# Patient Record
Sex: MALE | Race: BLACK OR AFRICAN AMERICAN | NOT HISPANIC OR LATINO | Employment: OTHER | URBAN - NONMETROPOLITAN AREA
[De-identification: names, ages, dates, MRNs, and addresses within clinical notes are randomized per-mention and may not be internally consistent; named-entity substitution may affect disease eponyms.]

---

## 2017-01-01 ENCOUNTER — TELEPHONE (OUTPATIENT)
Dept: CARDIOLOGY | Facility: CLINIC | Age: 77
End: 2017-01-01

## 2017-01-01 ENCOUNTER — OFFICE VISIT (OUTPATIENT)
Dept: CARDIOLOGY | Facility: CLINIC | Age: 77
End: 2017-01-01

## 2017-01-01 VITALS
HEIGHT: 66 IN | WEIGHT: 217 LBS | DIASTOLIC BLOOD PRESSURE: 80 MMHG | HEART RATE: 68 BPM | SYSTOLIC BLOOD PRESSURE: 124 MMHG | BODY MASS INDEX: 34.87 KG/M2

## 2017-01-01 DIAGNOSIS — R94.31 LEFT AXIS DEVIATION: ICD-10-CM

## 2017-01-01 DIAGNOSIS — I27.20 PULMONARY HYPERTENSION (HCC): ICD-10-CM

## 2017-01-01 DIAGNOSIS — I10 ESSENTIAL HYPERTENSION: ICD-10-CM

## 2017-01-01 DIAGNOSIS — I48.0 PAROXYSMAL ATRIAL FIBRILLATION (HCC): Primary | ICD-10-CM

## 2017-01-01 DIAGNOSIS — Z79.01 CURRENT USE OF LONG TERM ANTICOAGULATION: ICD-10-CM

## 2017-01-01 PROCEDURE — 99213 OFFICE O/P EST LOW 20 MIN: CPT | Performed by: NURSE PRACTITIONER

## 2017-01-01 PROCEDURE — 93000 ELECTROCARDIOGRAM COMPLETE: CPT | Performed by: NURSE PRACTITIONER

## 2017-01-06 ENCOUNTER — TELEPHONE (OUTPATIENT)
Dept: CARDIOLOGY | Facility: CLINIC | Age: 77
End: 2017-01-06

## 2017-01-18 ENCOUNTER — TELEPHONE (OUTPATIENT)
Dept: CARDIOLOGY | Facility: CLINIC | Age: 77
End: 2017-01-18

## 2017-02-08 ENCOUNTER — TELEPHONE (OUTPATIENT)
Dept: CARDIOLOGY | Facility: CLINIC | Age: 77
End: 2017-02-08

## 2017-02-08 NOTE — TELEPHONE ENCOUNTER
Patient came in to office requesting samples of eliquis 5mg bid, eliquis 5mg samples # 28 tablets given to patient.

## 2017-02-22 ENCOUNTER — TELEPHONE (OUTPATIENT)
Dept: CARDIOLOGY | Facility: CLINIC | Age: 77
End: 2017-02-22

## 2017-03-24 ENCOUNTER — TELEPHONE (OUTPATIENT)
Dept: CARDIOLOGY | Facility: CLINIC | Age: 77
End: 2017-03-24

## 2017-04-21 ENCOUNTER — TELEPHONE (OUTPATIENT)
Dept: CARDIOLOGY | Facility: CLINIC | Age: 77
End: 2017-04-21

## 2017-04-21 NOTE — TELEPHONE ENCOUNTER
Zuleima called requesting samples of Eliquis 5mg bid. Samples of Eliquis 5mg ready for -42 tablets.

## 2017-05-12 ENCOUNTER — TELEPHONE (OUTPATIENT)
Dept: CARDIOLOGY | Facility: CLINIC | Age: 77
End: 2017-05-12

## 2017-06-20 ENCOUNTER — OFFICE VISIT (OUTPATIENT)
Dept: CARDIOLOGY | Facility: CLINIC | Age: 77
End: 2017-06-20

## 2017-06-20 VITALS
BODY MASS INDEX: 34.72 KG/M2 | WEIGHT: 216 LBS | SYSTOLIC BLOOD PRESSURE: 120 MMHG | HEART RATE: 68 BPM | DIASTOLIC BLOOD PRESSURE: 90 MMHG | HEIGHT: 66 IN

## 2017-06-20 DIAGNOSIS — R07.89 OTHER CHEST PAIN: ICD-10-CM

## 2017-06-20 DIAGNOSIS — I10 ESSENTIAL HYPERTENSION: ICD-10-CM

## 2017-06-20 DIAGNOSIS — G47.34 NOCTURNAL OXYGEN DESATURATION: ICD-10-CM

## 2017-06-20 DIAGNOSIS — I27.20 PULMONARY HYPERTENSION (HCC): ICD-10-CM

## 2017-06-20 DIAGNOSIS — I48.0 PAROXYSMAL ATRIAL FIBRILLATION (HCC): Primary | ICD-10-CM

## 2017-06-20 DIAGNOSIS — Z79.899 LONG TERM CURRENT USE OF ANTIARRHYTHMIC DRUG: ICD-10-CM

## 2017-06-20 DIAGNOSIS — R06.02 SHORTNESS OF BREATH: ICD-10-CM

## 2017-06-20 DIAGNOSIS — Z79.01 CURRENT USE OF LONG TERM ANTICOAGULATION: ICD-10-CM

## 2017-06-20 PROCEDURE — 93000 ELECTROCARDIOGRAM COMPLETE: CPT | Performed by: NURSE PRACTITIONER

## 2017-06-20 PROCEDURE — 99214 OFFICE O/P EST MOD 30 MIN: CPT | Performed by: NURSE PRACTITIONER

## 2017-06-20 RX ORDER — ALBUTEROL SULFATE 90 UG/1
2 AEROSOL, METERED RESPIRATORY (INHALATION) EVERY 4 HOURS PRN
COMMUNITY

## 2017-06-20 RX ORDER — HYDRALAZINE HYDROCHLORIDE 10 MG/1
10 TABLET, FILM COATED ORAL DAILY PRN
Qty: 90 TABLET | Refills: 2 | Status: SHIPPED | OUTPATIENT
Start: 2017-06-20 | End: 2017-01-01 | Stop reason: ALTCHOICE

## 2017-06-20 NOTE — PROGRESS NOTES
Chief Complaint   Patient presents with   • Follow-up     6 month follow-up, reports has been having issues with HTN, reports feeling tired all the time and falling asleep during the day, does not wear oxygen at night,    • Shortness of Breath     at times, reports has been using Pro air inhaler and has been helpful.       Cardiac Complaints  chest pressure/discomfort and dyspnea      Subjective   Fadi Teran is a 76 y.o. male with a history of nonobstructive disease diagnosed in 1999 being managed medically. His cardiac workup in 2013 showed LV function was slightly low with some reverse redistribution in the anterior wall. Later Lopressor was added for better blood pressure control. In 2015, he underwent a cardiac catheterization that showed no significant disease. He also had a carotid ultrasound that did not show significant stenosis. Later an event monitor was ordered that showed a baseline sinus rhythm. In March 2016 he was hospitalized for syncope. Echocardiogram showed a normal ejection fraction but his rhythm was in atrial fibrillation. He is being managed medically with amiodarone and Xarelto.  He returns today for follow up and states recent ER visit for hypertension.  He reports he has been having very labile blood pressures which go from very low to very high.  He does have medication changes with amiodarone being off his list as well as norvasc.  He states these changes were with PCP.  He has complaints today of chest pressure which seems to be a tightness in the left chest, sometimes comes and goes and sometimes stays all day.  Not related to exertion and he denies correlation with food.  He also has been falling asleep a lot during the day and seems to be more short of breath both with and without exertion. He did have a positive overnight in December but has not been wearing oxygen.  Labs he reports with PCP as well as most refills.          Cardiac History  Past Surgical History:   Procedure  Laterality Date   • CARDIAC CATHETERIZATION  03/22/1999    < 40% LCX. EF 45-50%.     • CARDIAC CATHETERIZATION  07/20/2015    No significant disease     • CARDIOVASCULAR STRESS TEST  09/09/2010     (Mod) 3 min, 80% THR. BP- 156/60.     • CARDIOVASCULAR STRESS TEST  04/23/2013     L. Myoview- EF 45%. Reverse Redistribution in Anterior     • CARDIOVASCULAR STRESS TEST  07/19/2015    Lexiscan (LCR)- EF 48%, moderate inferior ischemia     • CONVERTED (HISTORICAL) DUPLEX SCANS  12/07/2013    Carotid u/s- no evidence of hemodynamically significant stenosis     • CONVERTED (HISTORICAL) DUPLEX SCANS  07/21/2015    Carotid US- negative     • ECHO - CONVERTED  12/05/2007    EF 50-55%, RVSP- 57 mmHg.     • ECHO - CONVERTED  09/09/2010     EF 40-45%, RVSP 48 mmHg     • ECHO - CONVERTED  04/23/2013    EF 45-50%. Anterior WMA. RVSP- 46 mmHg.     • ECHO - CONVERTED  07/19/2015    (LCR)- EF 40-45%, RSVP 37, no effusion     • ECHO - CONVERTED  03/01/2016    EF 60%, atrial fibrillation, mild MR, mild to mod AI, RVSP 50 mmHg     • OTHER SURGICAL HISTORY  2015    eCardio- sinus, no medication changes made     • OTHER SURGICAL HISTORY  12/01/2016    Positive for nocturnal desaturation       Current Outpatient Prescriptions   Medication Sig Dispense Refill   • albuterol (PROVENTIL HFA;VENTOLIN HFA) 108 (90 BASE) MCG/ACT inhaler Inhale 2 puffs Every 4 (Four) Hours As Needed for Wheezing.     • apixaban (ELIQUIS) 5 MG tablet tablet Take 1 tablet by mouth 2 (Two) Times a Day. 28 tablet 0   • glimepiride (AMARYL) 2 MG tablet Take 2 mg by mouth Every Morning Before Breakfast.     • lisinopril-hydrochlorothiazide (PRINZIDE,ZESTORETIC) 20-25 MG per tablet Take 1 tablet by mouth daily.     • metoprolol tartrate (LOPRESSOR) 25 MG tablet Take 25 mg by mouth 2 (Two) Times a Day.     • potassium chloride (K-DUR,KLOR-CON) 10 MEQ CR tablet Take 10 mEq by mouth daily.     • hydrALAZINE (APRESOLINE) 10 MG tablet Take 1 tablet by mouth Daily As Needed  "(take one tablet if blood pressure is greater than 160/90). 90 tablet 2     No current facility-administered medications for this visit.        Contrast dye    Past Medical History:   Diagnosis Date   • CAD (coronary artery disease)     Non-obstructive   • CHF (congestive heart failure)    • Clotting disorder    • H. pylori infection    • History of CT scan of head 07/21/2015     No acute changes seen     • Hypercholesterolemia    • Hypertension    • Hypertensive heart disease        Social History     Social History   • Marital status:      Spouse name: N/A   • Number of children: N/A   • Years of education: N/A     Occupational History   • Not on file.     Social History Main Topics   • Smoking status: Never Smoker   • Smokeless tobacco: Never Used   • Alcohol use No   • Drug use: No   • Sexual activity: Not on file     Other Topics Concern   • Not on file     Social History Narrative       Family History   Problem Relation Age of Onset   • Heart disease Father        Review of Systems   Constitutional: Positive for fatigue.   HENT: Negative.    Eyes: Negative.    Respiratory: Positive for shortness of breath.    Cardiovascular: Positive for chest pain and palpitations.   Endocrine: Negative.    Skin: Negative.    Neurological: Negative.    Psychiatric/Behavioral: Negative.        DiabetesYes  Thyroidnormal    Objective     /90 (BP Location: Left arm)  Pulse 68  Ht 66\" (167.6 cm)  Wt 216 lb (98 kg)  BMI 34.86 kg/m2    Physical Exam   Constitutional: He is oriented to person, place, and time. He appears well-developed and well-nourished.   HENT:   Head: Normocephalic and atraumatic.   Eyes: EOM are normal. Pupils are equal, round, and reactive to light.   Neck: Normal range of motion. Neck supple.   Cardiovascular: Normal rate and regular rhythm.    Murmur heard.  Pulmonary/Chest: Effort normal and breath sounds normal.   Abdominal: Soft.   Neurological: He is alert and oriented to person, place, " and time.   Skin: Skin is warm and dry.   Psychiatric: He has a normal mood and affect.         ECG 12 Lead  Date/Time: 6/20/2017 12:50 PM  Performed by: PETTY BROWN  Authorized by: PETTY BROWN   Rhythm: sinus rhythm  BPM: 68  Conduction: 1st degree  Clinical impression: abnormal ECG  Comments: Normal QT            Assessment/Plan     HR is stable.  Diastolic blood pressure is slightly elevated but patient states at home his blood pressure is often 190/100.  We will add hydralazine 10mg po prn for patient to take daily as needed for SBP greater than 160 and DBP greater than 90.  EKG done today for PAF showed NSR with first degree block and normal QT.   We will advise on repeat overnight oximetry to look for any worsening nocturnal desaturation as he states he is not wearing oxygen at night and is always tired during the day and has a great deal of issues with labile blood pressures, which are often higher in the morning.  We will also advise on cardiac workup as he does report some chest pain at times which appears to be atypical, but patient has had a positive stress in the past.  We will also encourage on echo to look for any worsening pulmonary pressures as more shortness of breath reported. More recommendations to follow.  Good cardiac ADA diet and activity as tolerated advised.  Samples of eliquis provided.  6 month follow up will be advised or sooner if needed.      Problems Addressed this Visit        Cardiovascular and Mediastinum    Essential hypertension    Relevant Medications    hydrALAZINE (APRESOLINE) 10 MG tablet    Other Relevant Orders    Overnight Sleep Oximetry Study    Adult Transthoracic Echo Complete    Stress Test With Myocardial Perfusion One Day    Pulmonary hypertension    Relevant Orders    Overnight Sleep Oximetry Study    Paroxysmal atrial fibrillation - Primary    Relevant Orders    ECG 12 Lead    Adult Transthoracic Echo Complete    Stress Test With Myocardial Perfusion One  Day       Other    Current use of long term anticoagulation      Other Visit Diagnoses     Nocturnal oxygen desaturation        Relevant Orders    Overnight Sleep Oximetry Study    Long term current use of antiarrhythmic drug        Other chest pain        Relevant Orders    Adult Transthoracic Echo Complete    Stress Test With Myocardial Perfusion One Day    Shortness of breath        Relevant Orders    Adult Transthoracic Echo Complete    Stress Test With Myocardial Perfusion One Day                  Electronically signed by Kinjal Hdz, CARLY June 20, 2017 5:25 PM

## 2017-06-26 ENCOUNTER — HOSPITAL ENCOUNTER (OUTPATIENT)
Dept: CARDIOLOGY | Facility: HOSPITAL | Age: 77
Discharge: HOME | End: 2017-06-26

## 2017-06-26 ENCOUNTER — OUTSIDE FACILITY SERVICE (OUTPATIENT)
Dept: CARDIOLOGY | Facility: CLINIC | Age: 77
End: 2017-06-26

## 2017-06-26 DIAGNOSIS — R06.02 SHORTNESS OF BREATH: ICD-10-CM

## 2017-06-26 DIAGNOSIS — R07.89 OTHER CHEST PAIN: ICD-10-CM

## 2017-06-26 DIAGNOSIS — I10 ESSENTIAL HYPERTENSION: ICD-10-CM

## 2017-06-26 DIAGNOSIS — I48.0 PAROXYSMAL ATRIAL FIBRILLATION (HCC): ICD-10-CM

## 2017-06-26 LAB
MAXIMAL PREDICTED HEART RATE: 144 BPM
STRESS TARGET HR: 122 BPM

## 2017-06-26 PROCEDURE — 93017 CV STRESS TEST TRACING ONLY: CPT

## 2017-06-26 PROCEDURE — A9500 TC99M SESTAMIBI: HCPCS | Performed by: INTERNAL MEDICINE

## 2017-06-26 PROCEDURE — 0 TECHNETIUM SESTAMIBI: Performed by: INTERNAL MEDICINE

## 2017-06-26 PROCEDURE — 93306 TTE W/DOPPLER COMPLETE: CPT | Performed by: INTERNAL MEDICINE

## 2017-06-26 PROCEDURE — 93018 CV STRESS TEST I&R ONLY: CPT | Performed by: INTERNAL MEDICINE

## 2017-06-26 PROCEDURE — 78452 HT MUSCLE IMAGE SPECT MULT: CPT | Performed by: INTERNAL MEDICINE

## 2017-06-26 PROCEDURE — 78452 HT MUSCLE IMAGE SPECT MULT: CPT

## 2017-06-26 PROCEDURE — 93306 TTE W/DOPPLER COMPLETE: CPT

## 2017-06-26 PROCEDURE — 25010000002 REGADENOSON 0.4 MG/5ML SOLUTION: Performed by: INTERNAL MEDICINE

## 2017-06-26 RX ADMIN — REGADENOSON 0.4 MG: 0.08 INJECTION, SOLUTION INTRAVENOUS at 13:00

## 2017-06-26 RX ADMIN — Medication 1 DOSE: at 13:00

## 2017-06-28 ENCOUNTER — TELEPHONE (OUTPATIENT)
Dept: CARDIOLOGY | Facility: CLINIC | Age: 77
End: 2017-06-28

## 2017-07-28 ENCOUNTER — TELEPHONE (OUTPATIENT)
Dept: CARDIOLOGY | Facility: CLINIC | Age: 77
End: 2017-07-28

## 2017-07-28 NOTE — TELEPHONE ENCOUNTER
Patient called requesting samples of Eliquis 5mg bid. Samples of Eliquis 5mg ready for -28 tablets.

## 2017-08-11 ENCOUNTER — TELEPHONE (OUTPATIENT)
Dept: CARDIOLOGY | Facility: CLINIC | Age: 77
End: 2017-08-11

## 2017-08-11 NOTE — TELEPHONE ENCOUNTER
Patients wife called requesting samples of Eliquis 5mg bid for patient, samples of Eliquis 5mg ready for -28 tablets.

## 2017-09-01 ENCOUNTER — TELEPHONE (OUTPATIENT)
Dept: CARDIOLOGY | Facility: CLINIC | Age: 77
End: 2017-09-01

## 2017-09-22 ENCOUNTER — TELEPHONE (OUTPATIENT)
Dept: CARDIOLOGY | Facility: CLINIC | Age: 77
End: 2017-09-22

## 2017-09-22 NOTE — TELEPHONE ENCOUNTER
Patients wife called requesting samples of Eliquis 5mg bid. Samples of Eliquis 5mg ready for -14 tablets.

## 2017-10-13 ENCOUNTER — TELEPHONE (OUTPATIENT)
Dept: CARDIOLOGY | Facility: CLINIC | Age: 77
End: 2017-10-13

## 2017-10-13 NOTE — TELEPHONE ENCOUNTER
Zuleima called requesting samples of Eliquis 5mg per patients request. Samples of Eliquis 5mg ready for -28 tablets.

## 2017-10-27 ENCOUNTER — TELEPHONE (OUTPATIENT)
Dept: CARDIOLOGY | Facility: CLINIC | Age: 77
End: 2017-10-27

## 2017-10-27 NOTE — TELEPHONE ENCOUNTER
Patient's wife Zuleima called requesting samples of Eliquis 5 mg BID. Zuleima aware that samples are ready to be picked up.

## 2017-11-17 NOTE — TELEPHONE ENCOUNTER
Zuleima called requesting samples of Eliquis 5mg bid, samples of Eliquis 5mg ready for -28 tablets.

## 2017-12-01 NOTE — TELEPHONE ENCOUNTER
Received request for samples of Eliquis 5 mg bid, samples of Eliquis 5 mg # 42 tablet's prepared for patient to .

## 2017-12-18 NOTE — PROGRESS NOTES
Chief Complaint   Patient presents with   • Follow-up     For cardiac management. Last labs 2 months ago per PCP. PCP refills medications. He states having problems when he eats swallowing his food.   • Shortness of Breath     He states having more shortness of breath, is now using inhaler.   • Dizziness     Only if he turns his head to fast, has occasional balance problem.   • Med Refill     Needs samples of Eliquis.       Subjective       Fadi Teran is a 77 y.o. male with a history of nonobstructive disease diagnosed in 1999 being managed medically. His cardiac workup in 2013 showed LV function was slightly low with some reverse redistribution in the anterior wall. Later Lopressor was added for better blood pressure control. In 2015, he underwent a cardiac catheterization that showed no significant disease. He also had a carotid ultrasound that did not show significant stenosis. Later an event monitor was ordered that showed a baseline sinus rhythm. In March 2016 he was hospitalized for syncope. Echocardiogram showed a normal ejection fraction but his rhythm was in atrial fibrillation. He is being managed medically with rate control and Xarelto.   At his last office visit an overnight oxygen study, stress and echo ordered. Overnight oxygen study was abnormal, but he is not using oxygen or CPAP. Stress test showed some reperfusion issue with inferior wall being managed medically. Echo showed LV ejection fraction 50-55% with no significant valvular issues. PA pressure was increased.   Today he comes to the office for a follow up visit and denies chest pain. He is now using an inhaler which has improved shortness of breath. He denies lightheadedness but admits to sometimes having mild dizziness when turns his head or quick position change.     HPI         Cardiac History:    Past Surgical History:   Procedure Laterality Date   • CARDIAC CATHETERIZATION  03/22/1999    < 40% LCX. EF 45-50%.     • CARDIAC  CATHETERIZATION  07/20/2015    No significant disease     • CARDIOVASCULAR STRESS TEST  09/09/2010     (Mod) 3 min, 80% THR. BP- 156/60.     • CARDIOVASCULAR STRESS TEST  04/23/2013     L. Myoview- EF 45%. Reverse Redistribution in Anterior     • CARDIOVASCULAR STRESS TEST  07/19/2015    Lexiscan (LCR)- EF 48%, moderate inferior ischemia     • CARDIOVASCULAR STRESS TEST  06/26/2017    Lexiscan- EF 4045%, few PVCs, reperfusion changes inferior wall, medication management   • CONVERTED (HISTORICAL) DUPLEX SCANS  12/07/2013    Carotid u/s- no evidence of hemodynamically significant stenosis     • CONVERTED (HISTORICAL) DUPLEX SCANS  07/21/2015    Carotid US- negative     • ECHO - CONVERTED  12/05/2007    EF 50-55%, RVSP- 57 mmHg.     • ECHO - CONVERTED  09/09/2010     EF 40-45%, RVSP 48 mmHg     • ECHO - CONVERTED  04/23/2013    EF 45-50%. Anterior WMA. RVSP- 46 mmHg.     • ECHO - CONVERTED  07/19/2015    (LCR)- EF 40-45%, RSVP 37, no effusion     • ECHO - CONVERTED  03/01/2016    EF 60%, atrial fibrillation, mild MR, mild to mod AI, RVSP 50 mmHg     • ECHO - CONVERTED  06/26/2017    EF 50-55%, no AS, mild MR, RVSP 48 mmHg   • OTHER SURGICAL HISTORY  2015    eCardio- sinus, no medication changes made     • OTHER SURGICAL HISTORY  12/01/2016    Positive for nocturnal desaturation       Current Outpatient Prescriptions   Medication Sig Dispense Refill   • albuterol (PROVENTIL HFA;VENTOLIN HFA) 108 (90 BASE) MCG/ACT inhaler Inhale 2 puffs Every 4 (Four) Hours As Needed for Wheezing.     • apixaban (ELIQUIS) 5 MG tablet tablet Take 1 tablet by mouth 2 (Two) Times a Day. 42 tablet 0   • glimepiride (AMARYL) 2 MG tablet Take 2 mg by mouth Every Morning Before Breakfast.     • lisinopril-hydrochlorothiazide (PRINZIDE,ZESTORETIC) 20-25 MG per tablet Take 1 tablet by mouth 2 (Two) Times a Day.     • metoprolol tartrate (LOPRESSOR) 25 MG tablet Take 25 mg by mouth 2 (Two) Times a Day.       No current facility-administered  medications for this visit.        Contrast dye    Past Medical History:   Diagnosis Date   • CAD (coronary artery disease)     Non-obstructive   • CHF (congestive heart failure)    • Clotting disorder    • H. pylori infection    • History of CT scan of head 07/21/2015     No acute changes seen     • Hypercholesterolemia    • Hypertension    • Hypertensive heart disease        Social History     Social History   • Marital status:      Spouse name: N/A   • Number of children: N/A   • Years of education: N/A     Occupational History   • Not on file.     Social History Main Topics   • Smoking status: Never Smoker   • Smokeless tobacco: Never Used   • Alcohol use No   • Drug use: No   • Sexual activity: Not on file     Other Topics Concern   • Not on file     Social History Narrative       Family History   Problem Relation Age of Onset   • Heart disease Father        Review of Systems   Constitution: Negative for decreased appetite, diaphoresis, fever and malaise/fatigue.   HENT: Negative for congestion, nosebleeds and sore throat.    Eyes: Negative for blurred vision.   Cardiovascular: Positive for irregular heartbeat. Negative for chest pain, near-syncope and paroxysmal nocturnal dyspnea.   Respiratory: Positive for shortness of breath. Negative for cough and sleep disturbances due to breathing.    Endocrine: Negative for cold intolerance and heat intolerance.   Hematologic/Lymphatic: Negative for adenopathy. Does not bruise/bleed easily.   Skin: Negative for itching and nail changes.   Musculoskeletal: Negative for arthritis and myalgias.   Gastrointestinal: Positive for dysphagia. Negative for abdominal pain, heartburn, melena, nausea and vomiting.   Genitourinary: Positive for nocturia. Negative for dysuria and hematuria.   Neurological: Positive for dizziness. Negative for light-headedness, seizures and vertigo.   Psychiatric/Behavioral: Negative for altered mental status. The patient does not have  "insomnia.    Allergic/Immunologic: Negative for environmental allergies and hives.    Diabetes- Yes  Thyroid-normal    Objective     /80 (BP Location: Left arm)  Pulse 68  Ht 167.6 cm (65.98\")  Wt 98.4 kg (217 lb)  BMI 35.04 kg/m2    Physical Exam   Constitutional: He is oriented to person, place, and time. He appears well-nourished.   HENT:   Head: Normocephalic.   Eyes: Conjunctivae are normal. Pupils are equal, round, and reactive to light.   Neck: Normal range of motion. Neck supple. No JVD present. Carotid bruit is not present.   Cardiovascular: Normal rate, regular rhythm, S1 normal and S2 normal.    No murmur heard.  Pulmonary/Chest: Breath sounds normal. He has no wheezes. He has no rales.   Abdominal: Soft. Bowel sounds are normal. He exhibits no distension. There is no tenderness.   Musculoskeletal: Normal range of motion. He exhibits no edema.   Neurological: He is alert and oriented to person, place, and time.   Skin: Skin is warm. No rash noted. No pallor.   Psychiatric: He has a normal mood and affect. His speech is normal and behavior is normal.   Vitals reviewed.       ECG 12 Lead  Date/Time: 12/19/2017 5:29 PM  Performed by: BONNIE KENDALL  Authorized by: BONNIE KENDALL   Comparison: compared with previous ECG from 6/20/2017  Comparison to previous ECG: Sinus first degree AV block  Rhythm: sinus rhythm  BPM: 68  QRS axis: left                  Assessment/Plan      Fadi was seen today for follow-up, shortness of breath, dizziness and med refill.    Diagnoses and all orders for this visit:    Paroxysmal atrial fibrillation    Essential hypertension    Pulmonary hypertension    Current use of long term anticoagulation    Left axis deviation    Other orders  -     ECG 12 Lead      Overnight oxygen study done last year showed mild nocturnal desaturation and echo showed increased PA pressure. I advised him to further discuss with you if recommend referral for sleep study. He reports " improvement of shortness of breath with use of inhaler. He also reports some issues with swallowing food. He may need EGD and advised to also discuss with you.   We also reviewed his most recent stress and echo reports. LV function low normal and reverse redistribution noted with recommendation for medical management. His blood pressure and heart rate are normal. For management of PAF an EKG done today that shows sinus rhythm. No medication changes made today. Samples of Eliquis given. Good diet and weight loss encouraged. He follows with you for management of labs.               Electronically signed by CARLY Mike,  December 19, 2017 5:29 PM

## 2018-01-01 ENCOUNTER — TELEPHONE (OUTPATIENT)
Dept: CARDIOLOGY | Facility: CLINIC | Age: 78
End: 2018-01-01

## 2018-01-01 ENCOUNTER — OFFICE VISIT (OUTPATIENT)
Dept: CARDIOLOGY | Facility: CLINIC | Age: 78
End: 2018-01-01

## 2018-01-01 VITALS
SYSTOLIC BLOOD PRESSURE: 134 MMHG | DIASTOLIC BLOOD PRESSURE: 92 MMHG | BODY MASS INDEX: 33.59 KG/M2 | WEIGHT: 209 LBS | HEART RATE: 86 BPM | HEIGHT: 66 IN

## 2018-01-01 DIAGNOSIS — Z79.01 CURRENT USE OF LONG TERM ANTICOAGULATION: ICD-10-CM

## 2018-01-01 DIAGNOSIS — I10 ESSENTIAL HYPERTENSION: ICD-10-CM

## 2018-01-01 DIAGNOSIS — I25.10 CORONARY ARTERY DISEASE INVOLVING NATIVE CORONARY ARTERY OF NATIVE HEART WITHOUT ANGINA PECTORIS: ICD-10-CM

## 2018-01-01 DIAGNOSIS — I51.9 LV DYSFUNCTION: ICD-10-CM

## 2018-01-01 DIAGNOSIS — I45.4 IVCD (INTRAVENTRICULAR CONDUCTION DEFECT): ICD-10-CM

## 2018-01-01 DIAGNOSIS — I48.0 PAROXYSMAL ATRIAL FIBRILLATION (HCC): Primary | ICD-10-CM

## 2018-01-01 DIAGNOSIS — I27.20 PULMONARY HYPERTENSION (HCC): ICD-10-CM

## 2018-01-01 DIAGNOSIS — I49.3 PVC (PREMATURE VENTRICULAR CONTRACTION): ICD-10-CM

## 2018-01-01 PROCEDURE — 99213 OFFICE O/P EST LOW 20 MIN: CPT | Performed by: NURSE PRACTITIONER

## 2018-01-01 PROCEDURE — 93000 ELECTROCARDIOGRAM COMPLETE: CPT | Performed by: NURSE PRACTITIONER

## 2018-01-01 RX ORDER — LISINOPRIL AND HYDROCHLOROTHIAZIDE 25; 20 MG/1; MG/1
1 TABLET ORAL DAILY
Qty: 90 TABLET | Refills: 3 | Status: SHIPPED | OUTPATIENT
Start: 2018-01-01

## 2018-06-18 NOTE — PROGRESS NOTES
Chief Complaint   Patient presents with   • Follow-up     For cardiac management. Needs refills on cardiac meds for 90 days to [facility] in [place]. Needs samples of Eliquis- will have ready. Labs per PCP about 2 months ago.    • Fatigue     Feels tired and weak- about the same as before.    • Numbness     Was working in the heat last week and started having numbness down left arm and in lips. States that he felt his left arm with his right hand and it felt very cold. Improved with rest but states that it took a while to resolve.        Subjective       Fadi Teran is a 77 y.o. male with a history of nonobstructive disease diagnosed in 1999 being managed medically. His cardiac workup in 2013 showed LV function was slightly low with some reverse redistribution in the anterior wall. Later Lopressor was added for better blood pressure control. In 2015, he underwent a cardiac catheterization that showed no significant disease. He also had a carotid ultrasound that did not show significant stenosis. Later an event monitor was ordered that showed a baseline sinus rhythm. In March 2016 he was hospitalized for syncope. Echocardiogram showed a normal ejection fraction but his rhythm was in atrial fibrillation. He is being managed medically with rate control and Xarelto. IN 2017, an overnight oxygen study, stress and echo ordered. Overnight oxygen study was abnormal, but he is not using oxygen or CPAP. Stress test showed some reperfusion issue with inferior wall being managed medically. Echo showed LV ejection fraction 50-55% with no significant valvular issues. PA pressure was increased.   Today he comes to the office for a follow up visit and denies new or worsening symptoms. He states he does have fatigue, but same as usual. He reports last week working on tractor in the sun racking hay, began to get to hot and developed numbness in left side of face and left arm. He went inside, rested and rehydrated and symptoms  resolved. He denies reoccurrence since that time. He admits to being easily choked when drinking liquids, which is not a new symptom. He reports recent labs were good.     HPI     Cardiac History:    Past Surgical History:   Procedure Laterality Date   • CARDIAC CATHETERIZATION  03/22/1999    < 40% LCX. EF 45-50%.     • CARDIAC CATHETERIZATION  07/20/2015    No significant disease     • CARDIOVASCULAR STRESS TEST  09/09/2010     (Mod) 3 min, 80% THR. BP- 156/60.     • CARDIOVASCULAR STRESS TEST  04/23/2013     L. Myoview- EF 45%. Reverse Redistribution in Anterior     • CARDIOVASCULAR STRESS TEST  07/19/2015    Lexiscan ([facility])- EF 48%, moderate inferior ischemia     • CARDIOVASCULAR STRESS TEST  06/26/2017    Lexiscan- EF 4045%, few PVCs, reperfusion changes inferior wall, medication management   • CONVERTED (HISTORICAL) DUPLEX SCANS  12/07/2013    Carotid u/s- no evidence of hemodynamically significant stenosis     • CONVERTED (HISTORICAL) DUPLEX SCANS  07/21/2015    Carotid US- negative     • ECHO - CONVERTED  12/05/2007    EF 50-55%, RVSP- 57 mmHg.     • ECHO - CONVERTED  09/09/2010     EF 40-45%, RVSP 48 mmHg     • ECHO - CONVERTED  04/23/2013    EF 45-50%. Anterior WMA. RVSP- 46 mmHg.     • ECHO - CONVERTED  07/19/2015    ([facility])- EF 40-45%, RSVP 37, no effusion     • ECHO - CONVERTED  03/01/2016    EF 60%, atrial fibrillation, mild MR, mild to mod AI, RVSP 50 mmHg     • ECHO - CONVERTED  06/26/2017    EF 50-55%, no AS, mild MR, RVSP 48 mmHg   • OTHER SURGICAL HISTORY  2015    eCardio- sinus, no medication changes made     • OTHER SURGICAL HISTORY  12/01/2016    Positive for nocturnal desaturation       Current Outpatient Prescriptions   Medication Sig Dispense Refill   • albuterol (PROVENTIL HFA;VENTOLIN HFA) 108 (90 BASE) MCG/ACT inhaler Inhale 2 puffs Every 4 (Four) Hours As Needed for Wheezing.     • glimepiride (AMARYL) 2 MG tablet Take 2 mg by mouth Every Morning Before Breakfast.     •  "lisinopril-hydrochlorothiazide (PRINZIDE,ZESTORETIC) 20-25 MG per tablet Take 1 tablet by mouth Daily. 90 tablet 3   • metoprolol tartrate (LOPRESSOR) 25 MG tablet Take 1 tablet by mouth Every 12 (Twelve) Hours. 90 tablet 3   • apixaban (ELIQUIS) 5 MG tablet tablet Take 1 tablet by mouth 2 (Two) Times a Day. 30 tablet 8     No current facility-administered medications for this visit.        Contrast dye    Past Medical History:   Diagnosis Date   • CAD (coronary artery disease)     Non-obstructive   • CHF (congestive heart failure)    • Clotting disorder    • H. pylori infection    • History of CT scan of head 07/21/2015     No acute changes seen     • Hypercholesterolemia    • Hypertension    • Hypertensive heart disease        Social History     Social History   • Marital status:      Spouse name: N/A   • Number of children: N/A   • Years of education: N/A     Occupational History   • Not on file.     Social History Main Topics   • Smoking status: Never Smoker   • Smokeless tobacco: Never Used   • Alcohol use No   • Drug use: No   • Sexual activity: Not on file     Other Topics Concern   • Not on file     Social History Narrative   • No narrative on file       Family History   Problem Relation Age of Onset   • Heart disease Father        Review of Systems   Constitution: Positive for malaise/fatigue. Negative for decreased appetite and diaphoresis.   HENT: Negative for congestion and nosebleeds.    Eyes: Negative for pain and redness.   Cardiovascular: Positive for leg swelling (mild, not a new symptom). Negative for chest pain and palpitations.   Respiratory: Positive for shortness of breath (with exertion, no worse). Negative for cough.    Endocrine: Negative for polydipsia, polyphagia and polyuria.   Hematologic/Lymphatic: Negative for bleeding problem. Does not bruise/bleed easily.   Skin: Negative for dry skin and itching.   Musculoskeletal: Positive for joint pain (\"arthritis\"). Negative for falls. " "  Gastrointestinal: Negative for abdominal pain, change in bowel habit, heartburn and melena.   Genitourinary: Negative for dysuria and hematuria.   Neurological: Positive for light-headedness and numbness (one episode).   Psychiatric/Behavioral: Negative for memory loss. The patient does not have insomnia and is not nervous/anxious.         Objective     /92   Pulse 86   Ht 167.6 cm (66\")   Wt 94.8 kg (209 lb)   BMI 33.73 kg/m²     Physical Exam   Constitutional: He is oriented to person, place, and time. He appears well-nourished. He does not appear ill. No distress.   HENT:   Head: Normocephalic.   Eyes: Conjunctivae are normal. Pupils are equal, round, and reactive to light.   Neck: Normal range of motion. Neck supple. No JVD present.   Cardiovascular: Normal rate, regular rhythm, S1 normal and S2 normal.    No murmur heard.  Pulmonary/Chest: Effort normal and breath sounds normal. He has no wheezes. He has no rales.   Abdominal: Soft. Bowel sounds are normal. He exhibits no distension. There is no tenderness.   Musculoskeletal: Normal range of motion. He exhibits edema (mild).   Neurological: He is alert and oriented to person, place, and time.   Skin: Skin is warm and dry. No pallor.   Psychiatric: He has a normal mood and affect. His behavior is normal.        ECG 12 Lead  Date/Time: 6/18/2018 4:12 PM  Performed by: BONNIE KENDALL  Authorized by: BONNIE KENDALL   Comparison: compared with previous ECG from 12/18/2017  Comparison to previous ECG: sinus  Rhythm: sinus rhythm  Ectopy: PVCs  Rate: normal  BPM: 86              Assessment/Plan      Fadi was seen today for follow-up, fatigue and numbness.    Diagnoses and all orders for this visit:    Paroxysmal atrial fibrillation  -     ECG 12 Lead    Pulmonary hypertension    Essential hypertension    LV dysfunction    Coronary artery disease involving native coronary artery of native heart without angina pectoris    Current use of long term " anticoagulation    IVCD (intraventricular conduction defect)  -     ECG 12 Lead    PVC (premature ventricular contraction)    Other orders  -     metoprolol tartrate (LOPRESSOR) 25 MG tablet; Take 1 tablet by mouth Every 12 (Twelve) Hours.  -     lisinopril-hydrochlorothiazide (PRINZIDE,ZESTORETIC) 20-25 MG per tablet; Take 1 tablet by mouth Daily.  -     apixaban (ELIQUIS) 5 MG tablet tablet; Take 1 tablet by mouth 2 (Two) Times a Day.        Patient's Body mass index is 33.73 kg/m². BMI is above normal parameters. Recommendations include: nutrition counseling. Heart healthy diet encouraged. I also advised adequate water intake for hydration.     His diastolic blood pressure is 92. EKG for management of PAF shows sinus rhythm with IVCD and one PVC noted. Continue same dose of Lopressor. CHADVASc score is 5 managed with Eliquis. He denies signs of bleeding. Advised to continue the same. Samples of Eliquis given.     The reports of his stress and echocardiogram done last year were reviewed. Carotid ultrasound in 2015 was negative. We discussed repeating due to recent episode of transient left sided numbness. At this time he does not want to have work up done. He agrees to call should he have any more symptoms or issues.     He follows with you for management of labs. Please forward a copy of lab reports to office.     A 6 month follow up scheduled.            Electronically signed by CARLY Mike,  June 21, 2018 5:36 PM

## 2018-06-21 PROBLEM — I45.4 IVCD (INTRAVENTRICULAR CONDUCTION DEFECT): Status: ACTIVE | Noted: 2018-01-01

## 2018-08-22 NOTE — TELEPHONE ENCOUNTER
Patients wife called requesting samples of Eliquis 5 mg. Samples #56 given. Tried to reach patient but NA

## 2021-02-01 NOTE — TELEPHONE ENCOUNTER
Samples of Eliquis 5 mg given.    Information: Selecting Yes will display possible errors in your note based on the variables you have selected. This validation is only offered as a suggestion for you. PLEASE NOTE THAT THE VALIDATION TEXT WILL BE REMOVED WHEN YOU FINALIZE YOUR NOTE. IF YOU WANT TO FAX A PRELIMINARY NOTE YOU WILL NEED TO TOGGLE THIS TO 'NO' IF YOU DO NOT WANT IT IN YOUR FAXED NOTE.

## 2023-06-13 NOTE — TELEPHONE ENCOUNTER
Patient called requesting samples of Eliquis 5 mg BID. 42 tablets are ready for .    
Medical Assessment Completed on: 13-Jun-2023 09:05